# Patient Record
Sex: FEMALE | Race: WHITE | NOT HISPANIC OR LATINO | URBAN - METROPOLITAN AREA
[De-identification: names, ages, dates, MRNs, and addresses within clinical notes are randomized per-mention and may not be internally consistent; named-entity substitution may affect disease eponyms.]

---

## 2023-10-10 ENCOUNTER — OFFICE VISIT (OUTPATIENT)
Dept: PRIMARY CARE | Facility: CLINIC | Age: 58
End: 2023-10-10
Payer: COMMERCIAL

## 2023-10-10 VITALS
TEMPERATURE: 97.3 F | DIASTOLIC BLOOD PRESSURE: 100 MMHG | SYSTOLIC BLOOD PRESSURE: 159 MMHG | HEIGHT: 66 IN | WEIGHT: 176 LBS | OXYGEN SATURATION: 97 % | HEART RATE: 102 BPM | BODY MASS INDEX: 28.28 KG/M2

## 2023-10-10 DIAGNOSIS — I30.9 ACUTE PERICARDITIS, UNSPECIFIED TYPE (HHS-HCC): Primary | ICD-10-CM

## 2023-10-10 DIAGNOSIS — R61 SWEATING PROFUSELY: ICD-10-CM

## 2023-10-10 DIAGNOSIS — Z23 NEEDS FLU SHOT: ICD-10-CM

## 2023-10-10 DIAGNOSIS — E28.2 PCOS (POLYCYSTIC OVARIAN SYNDROME): ICD-10-CM

## 2023-10-10 PROCEDURE — 90682 RIV4 VACC RECOMBINANT DNA IM: CPT | Performed by: FAMILY MEDICINE

## 2023-10-10 PROCEDURE — 1036F TOBACCO NON-USER: CPT | Performed by: FAMILY MEDICINE

## 2023-10-10 PROCEDURE — 99495 TRANSJ CARE MGMT MOD F2F 14D: CPT | Performed by: FAMILY MEDICINE

## 2023-10-10 PROCEDURE — 90471 IMMUNIZATION ADMIN: CPT | Performed by: FAMILY MEDICINE

## 2023-10-10 RX ORDER — CALCIUM CITRATE/VITAMIN D3 200MG-6.25
TABLET ORAL
COMMUNITY
Start: 2018-08-15

## 2023-10-10 RX ORDER — COLCHICINE 0.6 MG/1
0.6 CAPSULE ORAL
COMMUNITY
Start: 2023-10-07 | End: 2023-10-14

## 2023-10-10 RX ORDER — PHENTERMINE HYDROCHLORIDE 37.5 MG/1
37.5 TABLET ORAL
COMMUNITY
Start: 2023-02-02 | End: 2023-10-29

## 2023-10-10 RX ORDER — METFORMIN HYDROCHLORIDE 1000 MG/1
1000 TABLET ORAL DAILY
COMMUNITY
End: 2023-11-10 | Stop reason: ALTCHOICE

## 2023-10-10 RX ORDER — FEZOLINETANT 45 MG/1
45 TABLET, FILM COATED ORAL
COMMUNITY
Start: 2023-09-22 | End: 2023-11-10 | Stop reason: ALTCHOICE

## 2023-10-10 RX ORDER — PREDNISONE 20 MG/1
20 TABLET ORAL
COMMUNITY
Start: 2023-10-07 | End: 2023-10-14

## 2023-10-10 RX ORDER — AZITHROMYCIN 250 MG/1
TABLET, FILM COATED ORAL
COMMUNITY
End: 2023-11-10 | Stop reason: ALTCHOICE

## 2023-10-10 NOTE — PROGRESS NOTES
Subjective   Patient ID: Alek Hirsch is a 58 y.o. female who presents for Hospital Follow-up.    Assessment/Plan     Problem List Items Addressed This Visit    None  Visit Diagnoses       Acute pericarditis, unspecified type    -  Primary    PCOS (polycystic ovarian syndrome)        Sweating profusely        Needs flu shot        Relevant Orders    Flu vaccine, quadrivalent, recombinant, preservative free, adult (FLUBLOK) (Completed)            HPI    58-year-old female was recently hospitalized with chest pressure cardiac work-up was negative but concern for pneumonia on her CT chest with possible pericarditis patient was started on antibiotic with colchicine and anti-inflammatory with prednisone  Patient improved and was discharged home  Echo showed normal EF no acute abnormality    Today blood pressure is high advised patient to monitor patient will be off of Adipex azithromycin prednisone  Adipex was initiated by endocrine for weight loss as it may help with PCOS possible sweating    Currently patient is feeling much better no lab work today  Wants flu vaccine    Excessive sweating was seen by me endocrine GI had a significant lab work done no acute abnormality  Was seen by OB/GYN was started on veozah    History of right knee surgery  Mild carotid artery disease bilaterally  PCOS on metformin    No Known Allergies    Current Outpatient Medications   Medication Sig Dispense Refill    azithromycin (Zithromax) 250 mg tablet TAKE 2 TABLETS by mouth today, THEN take 1 TABLET once a day FOR the next 4 DAYS.      colchicine, gout, (Mitigare) 0.6 mg capsule capsule 1 capsule (0.6 mg).      fezolinetant (Veozah) 45 mg tablet 45 mg.      metFORMIN (Glucophage) 1,000 mg tablet Take 1 tablet (1,000 mg) by mouth once daily.      multivit with min-folic acid (Women's Multivitamin Gummies) 200 mcg tablet,chewable Chew.      phentermine (Adipex-P) 37.5 mg tablet 1 tablet (37.5 mg).      predniSONE (Deltasone) 20 mg tablet 1  "tablet (20 mg).       No current facility-administered medications for this visit.       Objective   Visit Vitals  BP (!) 159/100 (BP Location: Left arm, Patient Position: Sitting)   Pulse 102   Temp 36.3 °C (97.3 °F)   Ht 1.676 m (5' 6\")   Wt 79.8 kg (176 lb)   SpO2 97%   BMI 28.41 kg/m²   Smoking Status Never   BSA 1.93 m²     Physical Exam  Constitutional:       General: He is not in acute distress.     Appearance: Normal appearance.   HENT:      Head: Normocephalic and atraumatic.      Nose: Nose normal.   Eyes:      Extraocular Movements: Extraocular movements intact.      Conjunctiva/sclera: Conjunctivae normal.   Cardiovascular:      Rate and Rhythm: Normal rate and regular rhythm.   Pulmonary:      Effort: Pulmonary effort is normal.      Breath sounds: Normal breath sounds.   Skin:     General: Skin is warm.   Neurological:      Mental Status: He is alert and oriented to person, place, and time.   Psychiatric:         Mood and Affect: Mood normal.         Behavior: Behavior normal.   Immunization History   Administered Date(s) Administered    Flu vaccine (IIV4), preservative free *Check age/dose* 08/23/2020, 09/30/2021, 09/30/2022    Flu vaccine, quadrivalent, recombinant, preservative free, adult (FLUBLOK) 10/10/2023    Influenza, Unspecified 11/26/2011    Influenza, seasonal, injectable 10/04/2021    MMR vaccine, subcutaneous (MMR II) 05/24/2012    Moderna SARS-CoV-2 Vaccination 03/18/2021, 04/15/2021, 11/14/2021, 04/03/2022    Novel influenza-H1N1-09, preservative-free 12/30/2009    Td (adult), unspecified 05/24/2012    Typhoid, Parenteral 05/24/2012       Review of Systems    No visits with results within 4 Month(s) from this visit.   Latest known visit with results is:   Legacy Encounter on 01/25/2023   Component Date Value Ref Range Status    C PEPTIDE (NG/ML) IN SER/PLAS 01/25/2023 7.8 (H)  0.7 - 3.9 ng/mL Final    Proinsulin, Intact 01/25/2023 27.4 (H)  <=8.0 pmol/L Final    Insulin 01/25/2023 97 " (H)  3 - 25 uIU/mL Final       Radiology: Reviewed imaging in powerchart.  No results found.    No family history on file.  Social History     Socioeconomic History    Marital status:      Spouse name: None    Number of children: None    Years of education: None    Highest education level: None   Occupational History    None   Tobacco Use    Smoking status: Never    Smokeless tobacco: Never   Substance and Sexual Activity    Alcohol use: Not Currently    Drug use: Never    Sexual activity: None   Other Topics Concern    None   Social History Narrative    None     Social Determinants of Health     Financial Resource Strain: Not on file   Food Insecurity: Not on file   Transportation Needs: Not on file   Physical Activity: Not on file   Stress: Not on file   Social Connections: Not on file   Intimate Partner Violence: Not on file   Housing Stability: Not on file     Past Medical History:   Diagnosis Date    Overweight     Overweight    Personal history of other diseases of the female genital tract     History of PCOS     Past Surgical History:   Procedure Laterality Date    APPENDECTOMY  08/15/2018    Appendectomy    GALLBLADDER SURGERY  08/15/2018    Gallbladder Surgery    OTHER SURGICAL HISTORY  08/15/2018    Total Hysterectomy       Charting was completed using voice recognition technology and may include unintended errors.

## 2023-10-28 DIAGNOSIS — E88.810 METABOLIC SYNDROME: ICD-10-CM

## 2023-10-29 RX ORDER — PHENTERMINE HYDROCHLORIDE 37.5 MG/1
37.5 TABLET ORAL DAILY
Qty: 30 TABLET | Refills: 0 | Status: SHIPPED | OUTPATIENT
Start: 2023-10-29 | End: 2023-12-19

## 2023-11-01 ENCOUNTER — OFFICE VISIT (OUTPATIENT)
Dept: PRIMARY CARE | Facility: CLINIC | Age: 58
End: 2023-11-01
Payer: COMMERCIAL

## 2023-11-01 VITALS
BODY MASS INDEX: 28.61 KG/M2 | HEART RATE: 120 BPM | OXYGEN SATURATION: 99 % | HEIGHT: 66 IN | SYSTOLIC BLOOD PRESSURE: 140 MMHG | WEIGHT: 178 LBS | DIASTOLIC BLOOD PRESSURE: 90 MMHG | TEMPERATURE: 97.3 F

## 2023-11-01 DIAGNOSIS — R06.02 SOB (SHORTNESS OF BREATH) ON EXERTION: ICD-10-CM

## 2023-11-01 DIAGNOSIS — I10 BENIGN ESSENTIAL HYPERTENSION: ICD-10-CM

## 2023-11-01 DIAGNOSIS — R07.9 CHEST PAIN, UNSPECIFIED TYPE: Primary | ICD-10-CM

## 2023-11-01 PROBLEM — M17.11 PRIMARY OSTEOARTHRITIS OF RIGHT KNEE: Status: ACTIVE | Noted: 2023-11-01

## 2023-11-01 PROBLEM — R61 EXCESSIVE SWEATING: Status: ACTIVE | Noted: 2023-11-01

## 2023-11-01 PROBLEM — E78.2 HYPERLIPIDEMIA, MIXED: Status: ACTIVE | Noted: 2023-11-01

## 2023-11-01 PROBLEM — K21.9 GERD (GASTROESOPHAGEAL REFLUX DISEASE): Status: ACTIVE | Noted: 2023-11-01

## 2023-11-01 PROCEDURE — 1036F TOBACCO NON-USER: CPT | Performed by: FAMILY MEDICINE

## 2023-11-01 PROCEDURE — 3077F SYST BP >= 140 MM HG: CPT | Performed by: FAMILY MEDICINE

## 2023-11-01 PROCEDURE — 3080F DIAST BP >= 90 MM HG: CPT | Performed by: FAMILY MEDICINE

## 2023-11-01 PROCEDURE — 99214 OFFICE O/P EST MOD 30 MIN: CPT | Performed by: FAMILY MEDICINE

## 2023-11-01 NOTE — PROGRESS NOTES
Subjective   Patient ID: Alek Hirsch is a 58 y.o. female who presents for Hypertension.    Assessment/Plan     Problem List Items Addressed This Visit       Benign essential hypertension    Relevant Orders    Nuclear Stress Test     Other Visit Diagnoses       Chest pain, unspecified type    -  Primary    Relevant Orders    Nuclear Stress Test    SOB (shortness of breath) on exertion        Relevant Orders    Nuclear Stress Test          HPI    58-year-old female was recently hospitalized with chest pressure cardiac work-up was negative but concern for pneumonia on her CT chest with possible pericarditis patient was started on antibiotic with colchicine and anti-inflammatory with prednisone  Patient improved and was discharged home  Echo showed normal EF no acute abnormality    No chest pain or shortness of breath on exertion present  We will consider nuclear stress test  Blood pressure is high advised patient to stop Adipex  We will follow-up after nuclear stress test May start patient on atenolol  May consider starting patient on gabapentin    About high insulin level  We will also describe her abnormal CT chest in the hospital  We may refer patient to pulmonary and hematology oncology          This visit  Adipex was initiated by endocrine for weight loss as it may help with PCOS possible sweating    Currently patient is feeling much better no lab work today  Wants flu vaccine    Excessive sweating was seen by me endocrine GI had a significant lab work done no acute abnormality  Was seen by OB/GYN was started on veozah    History of right knee surgery  Mild carotid artery disease bilaterally  PCOS on metformin    No Known Allergies    Current Outpatient Medications   Medication Sig Dispense Refill    fezolinetant (Veozah) 45 mg tablet 45 mg.      metFORMIN (Glucophage) 1,000 mg tablet Take 1 tablet (1,000 mg) by mouth once daily.      multivit with min-folic acid (Women's Multivitamin Gummies) 200 mcg  "tablet,chewable Chew.      phentermine (Adipex-P) 37.5 mg tablet TAKE 1 TABLET BY MOUTH DAILY 30 tablet 0    azithromycin (Zithromax) 250 mg tablet TAKE 2 TABLETS by mouth today, THEN take 1 TABLET once a day FOR the next 4 DAYS.       No current facility-administered medications for this visit.       Objective   Visit Vitals  /90 (BP Location: Left arm, Patient Position: Sitting)   Pulse (!) 120   Temp 36.3 °C (97.3 °F)   Ht 1.676 m (5' 6\")   Wt 80.7 kg (178 lb)   SpO2 99%   BMI 28.73 kg/m²   Smoking Status Never   BSA 1.94 m²     Physical Exam  Constitutional:       General: He is not in acute distress.     Appearance: Normal appearance.   HENT:      Head: Normocephalic and atraumatic.      Nose: Nose normal.   Eyes:      Extraocular Movements: Extraocular movements intact.      Conjunctiva/sclera: Conjunctivae normal.   Cardiovascular:      Rate and Rhythm: Normal rate and regular rhythm.   Pulmonary:      Effort: Pulmonary effort is normal.      Breath sounds: Normal breath sounds.   Skin:     General: Skin is warm.   Neurological:      Mental Status: He is alert and oriented to person, place, and time.   Psychiatric:         Mood and Affect: Mood normal.         Behavior: Behavior normal.   Immunization History   Administered Date(s) Administered    Flu vaccine (IIV4), preservative free *Check age/dose* 08/23/2020, 09/30/2021, 09/30/2022    Flu vaccine, quadrivalent, recombinant, preservative free, adult (FLUBLOK) 10/10/2023    Influenza, Unspecified 11/26/2011    Influenza, seasonal, injectable 10/04/2021    MMR vaccine, subcutaneous (MMR II) 05/24/2012    Moderna SARS-CoV-2 Vaccination 03/18/2021, 04/15/2021, 11/14/2021, 04/03/2022    Novel influenza-H1N1-09, preservative-free 12/30/2009    Td (adult), unspecified 05/24/2012    Typhoid, Parenteral 05/24/2012       Review of Systems    No visits with results within 4 Month(s) from this visit.   Latest known visit with results is:   Legacy Encounter on " 01/25/2023   Component Date Value Ref Range Status    C PEPTIDE (NG/ML) IN SER/PLAS 01/25/2023 7.8 (H)  0.7 - 3.9 ng/mL Final    Proinsulin, Intact 01/25/2023 27.4 (H)  <=8.0 pmol/L Final    Insulin 01/25/2023 97 (H)  3 - 25 uIU/mL Final       Radiology: Reviewed imaging in powerchart.  No results found.    No family history on file.  Social History     Socioeconomic History    Marital status:      Spouse name: None    Number of children: None    Years of education: None    Highest education level: None   Occupational History    None   Tobacco Use    Smoking status: Never    Smokeless tobacco: Never   Substance and Sexual Activity    Alcohol use: Not Currently    Drug use: Never    Sexual activity: None   Other Topics Concern    None   Social History Narrative    None     Social Determinants of Health     Financial Resource Strain: Not on file   Food Insecurity: Not on file   Transportation Needs: Not on file   Physical Activity: Not on file   Stress: Not on file   Social Connections: Not on file   Intimate Partner Violence: Not on file   Housing Stability: Not on file     Past Medical History:   Diagnosis Date    Overweight     Overweight    Personal history of other diseases of the female genital tract     History of PCOS     Past Surgical History:   Procedure Laterality Date    APPENDECTOMY  08/15/2018    Appendectomy    GALLBLADDER SURGERY  08/15/2018    Gallbladder Surgery    OTHER SURGICAL HISTORY  08/15/2018    Total Hysterectomy       Charting was completed using voice recognition technology and may include unintended errors.

## 2023-11-08 PROBLEM — R73.03 PREDIABETES: Status: ACTIVE | Noted: 2023-11-08

## 2023-11-08 PROBLEM — R55 VASOVAGAL SYNCOPE: Status: ACTIVE | Noted: 2023-11-08

## 2023-11-08 PROBLEM — R94.31 PROLONGED QT INTERVAL: Status: ACTIVE | Noted: 2023-11-08

## 2023-11-08 PROBLEM — Z96.652 HISTORY OF LEFT KNEE REPLACEMENT: Status: ACTIVE | Noted: 2023-06-14

## 2023-11-08 PROBLEM — R94.31 QT PROLONGATION: Status: ACTIVE | Noted: 2023-11-08

## 2023-11-08 PROBLEM — R73.9 HYPERGLYCEMIA: Status: ACTIVE | Noted: 2023-11-08

## 2023-11-08 PROBLEM — E88.819 INSULIN RESISTANCE: Status: ACTIVE | Noted: 2023-11-08

## 2023-11-08 PROBLEM — E66.9 OBESITY WITH BODY MASS INDEX 30 OR GREATER: Status: ACTIVE | Noted: 2023-11-08

## 2023-11-08 PROBLEM — E16.1 HYPERINSULINISM: Status: ACTIVE | Noted: 2023-11-08

## 2023-11-08 PROBLEM — E28.2 POLYCYSTIC OVARY SYNDROME: Status: ACTIVE | Noted: 2023-11-08

## 2023-11-08 PROBLEM — D49.0 PANCREATIC NEOPLASM: Status: ACTIVE | Noted: 2023-11-08

## 2023-11-08 PROBLEM — R23.2 HOT FLASHES: Status: ACTIVE | Noted: 2023-11-08

## 2023-11-08 PROBLEM — E34.2: Status: ACTIVE | Noted: 2023-11-08

## 2023-11-08 PROBLEM — E66.3 OVERWEIGHT: Status: ACTIVE | Noted: 2023-11-08

## 2023-11-08 PROBLEM — E66.9 OBESITY: Status: ACTIVE | Noted: 2018-08-16

## 2023-11-08 PROBLEM — M17.10 ARTHROPATHY OF KNEE: Status: ACTIVE | Noted: 2023-11-08

## 2023-11-08 PROBLEM — R89.9 ABNORMAL LABORATORY TEST RESULT: Status: ACTIVE | Noted: 2023-11-08

## 2023-11-08 PROBLEM — G43.909 MIGRAINE: Status: ACTIVE | Noted: 2023-11-08

## 2023-11-08 PROBLEM — E55.9 VITAMIN D DEFICIENCY: Status: ACTIVE | Noted: 2023-11-08

## 2023-11-08 PROBLEM — T84.82XA ARTHROFIBROSIS OF TOTAL KNEE REPLACEMENT (CMS-HCC): Status: ACTIVE | Noted: 2023-11-08

## 2023-11-08 PROBLEM — E16.1 INAPPROPRIATELY HIGH SERUM INSULIN: Status: ACTIVE | Noted: 2023-11-08

## 2023-11-08 PROBLEM — R13.10 DYSPHAGIA: Status: ACTIVE | Noted: 2023-11-08

## 2023-11-08 PROBLEM — E28.2 PCOS (POLYCYSTIC OVARIAN SYNDROME): Status: ACTIVE | Noted: 2023-11-08

## 2023-11-08 PROBLEM — R07.9 ACUTE CHEST PAIN: Status: ACTIVE | Noted: 2023-11-08

## 2023-11-08 RX ORDER — MULTIVIT-MIN/IRON FUM/FOLIC AC 7.5 MG-4
1 TABLET ORAL
COMMUNITY
Start: 2012-05-30 | End: 2023-11-10 | Stop reason: ALTCHOICE

## 2023-11-08 RX ORDER — IBUPROFEN 600 MG/1
1 TABLET ORAL EVERY 6 HOURS PRN
COMMUNITY
Start: 2006-02-13 | End: 2023-11-10 | Stop reason: ALTCHOICE

## 2023-11-08 RX ORDER — FEZOLINETANT 45 MG/1
1 TABLET, FILM COATED ORAL DAILY
COMMUNITY
Start: 2023-10-13 | End: 2023-12-19

## 2023-11-08 RX ORDER — METFORMIN HYDROCHLORIDE 500 MG/1
TABLET, EXTENDED RELEASE ORAL
COMMUNITY
Start: 2023-09-22 | End: 2024-03-29

## 2023-11-09 ENCOUNTER — OFFICE VISIT (OUTPATIENT)
Dept: ENDOCRINOLOGY | Facility: CLINIC | Age: 58
End: 2023-11-09
Payer: COMMERCIAL

## 2023-11-09 ENCOUNTER — TELEPHONE (OUTPATIENT)
Dept: PRIMARY CARE | Facility: CLINIC | Age: 58
End: 2023-11-09

## 2023-11-09 VITALS — SYSTOLIC BLOOD PRESSURE: 138 MMHG | WEIGHT: 179 LBS | DIASTOLIC BLOOD PRESSURE: 84 MMHG | BODY MASS INDEX: 28.89 KG/M2

## 2023-11-09 DIAGNOSIS — E88.810 DYSMETABOLIC SYNDROME X: Primary | ICD-10-CM

## 2023-11-09 DIAGNOSIS — E28.2 POLYCYSTIC OVARY SYNDROME: ICD-10-CM

## 2023-11-09 DIAGNOSIS — E16.1 HYPERINSULINISM: ICD-10-CM

## 2023-11-09 DIAGNOSIS — R61 EXCESSIVE SWEATING: ICD-10-CM

## 2023-11-09 PROCEDURE — 99214 OFFICE O/P EST MOD 30 MIN: CPT | Performed by: INTERNAL MEDICINE

## 2023-11-09 PROCEDURE — 1036F TOBACCO NON-USER: CPT | Performed by: INTERNAL MEDICINE

## 2023-11-09 PROCEDURE — 3079F DIAST BP 80-89 MM HG: CPT | Performed by: INTERNAL MEDICINE

## 2023-11-09 PROCEDURE — 3075F SYST BP GE 130 - 139MM HG: CPT | Performed by: INTERNAL MEDICINE

## 2023-11-09 NOTE — PROGRESS NOTES
Patient ID: Alek Hirsch is a 58 y.o. female who presents for Follow-up.  HPI  The patient comes in for follow up.    She has PCOS and insulin resistance diaphoresis hyperlipidemia hypertension.    She continues on metformin ER dropped down to 1000 mg which she has had no trouble tolerating and feels no different on the thousand versus the 2000.    She had been on phentermine up until about a week ago.    In October she was hospitalized with chest pain that was felt to be pleurisy.    She was on steroids and antibiotics.    She had a stress test that was negative her blood pressure bumped up.    The role of this her diaphoresis has continued.    She was put on Veozah by her OB/GYN which has had no impact.    She feels her appetite has not changed off of the phentermine at this point and thinks that she has made some lifestyle changes.    She is seeing her primary care doctor tomorrow.    ROS  Comprehensive review of systems is negative.    Objective   Physical Exam  Visit Vitals  /84      Vitals:    11/09/23 1624   Weight: 81.2 kg (179 lb)      Body mass index is 28.89 kg/m².      Weight 179 at 10 pounds still down 24    Eyes normal  ENT normal. No adenopathy  Thyroid palpable and normal. No nodules  Chest clear to auscultation  Heart sounds are normal  Abdomen nontender. Bowel sounds normal. No organomegaly  Feet are okay    Assessment/Plan     1.  Insulin resistance  2.  Diaphoresis  3.  Hypertension  4.  Hyperlipidemia  5.  PCOS    She will maintain her current regimen.    She will stay off the phentermine.    She will watch for hunger.    We again discussed endocrine causes for diaphoresis which we have essentially exhausted.    Advised that may be seeing dermatology as her next step.    She will follow-up with me in 3 months sooner as needed.

## 2023-11-10 ENCOUNTER — OFFICE VISIT (OUTPATIENT)
Dept: PRIMARY CARE | Facility: CLINIC | Age: 58
End: 2023-11-10
Payer: COMMERCIAL

## 2023-11-10 VITALS
TEMPERATURE: 97.3 F | DIASTOLIC BLOOD PRESSURE: 92 MMHG | HEART RATE: 89 BPM | WEIGHT: 180 LBS | HEIGHT: 66 IN | OXYGEN SATURATION: 99 % | SYSTOLIC BLOOD PRESSURE: 142 MMHG | BODY MASS INDEX: 28.93 KG/M2

## 2023-11-10 DIAGNOSIS — R61 EXCESSIVE SWEATING: Primary | ICD-10-CM

## 2023-11-10 DIAGNOSIS — I10 BENIGN ESSENTIAL HYPERTENSION: ICD-10-CM

## 2023-11-10 DIAGNOSIS — R73.9 HYPERGLYCEMIA: ICD-10-CM

## 2023-11-10 PROCEDURE — 1036F TOBACCO NON-USER: CPT | Performed by: FAMILY MEDICINE

## 2023-11-10 PROCEDURE — 3080F DIAST BP >= 90 MM HG: CPT | Performed by: FAMILY MEDICINE

## 2023-11-10 PROCEDURE — 99214 OFFICE O/P EST MOD 30 MIN: CPT | Performed by: FAMILY MEDICINE

## 2023-11-10 PROCEDURE — 3077F SYST BP >= 140 MM HG: CPT | Performed by: FAMILY MEDICINE

## 2023-11-10 RX ORDER — OXYBUTYNIN CHLORIDE 5 MG/1
2.5 TABLET ORAL NIGHTLY
Qty: 15 TABLET | Refills: 0 | Status: SHIPPED | OUTPATIENT
Start: 2023-11-10 | End: 2023-12-10

## 2023-11-10 NOTE — PROGRESS NOTES
Subjective   Patient ID: Alek Hirsch is a 58 y.o. female who presents for Follow-up.    Assessment/Plan     Problem List Items Addressed This Visit       Benign essential hypertension    Relevant Medications    oxybutynin (Ditropan) 5 mg tablet    Other Relevant Orders    C-Peptide    Insulin, Random    Proinsulin, Intact    CT chest wo IV contrast    Excessive sweating - Primary    Relevant Medications    oxybutynin (Ditropan) 5 mg tablet    Other Relevant Orders    C-Peptide    Insulin, Random    Proinsulin, Intact    CT chest wo IV contrast    Hyperglycemia    Relevant Medications    oxybutynin (Ditropan) 5 mg tablet    Other Relevant Orders    C-Peptide    Insulin, Random    Proinsulin, Intact    CT chest wo IV contrast   HPI    58-year-old female follow-up on excessive sweating at night    Holding Adipex  Was recently seen by endocrine  Previously abnormal CT chest we will repeat  Repeat lab work  Advised to follow-up with heme-onc  Consider low-dose oxybutynin  nuclear stress test - WNL  Blood pressure is high advised patient to stop Adipex    Follow-up for call us back within a month            This visit  Adipex was initiated by endocrine for weight loss as it may help with PCOS possible sweating    Currently patient is feeling much better no lab work today  Wants flu vaccine    Excessive sweating was seen by me endocrine GI had a significant lab work done no acute abnormality  Was seen by OB/GYN was started on veozah    History of right knee surgery  Mild carotid artery disease bilaterally  PCOS on metformin    No Known Allergies    Current Outpatient Medications   Medication Sig Dispense Refill    fezolinetant (Veozah) 45 mg tablet Take 1 tablet by mouth once daily. Take at the same time every day      metFORMIN  mg 24 hr tablet Take by mouth. 1,000 mg 2 tabs, ORAL, DAILY, 120 EA, 0 Refill(s), TAKE 2 TABLETS BY MOUTH TWICE DAILY      multivit with min-folic acid (Women's Multivitamin Gummies) 200  "mcg tablet,chewable Chew.      oxybutynin (Ditropan) 5 mg tablet Take 0.5 tablets (2.5 mg) by mouth once daily at bedtime. 15 tablet 0    phentermine (Adipex-P) 37.5 mg tablet TAKE 1 TABLET BY MOUTH DAILY (Patient not taking: Reported on 11/10/2023) 30 tablet 0     No current facility-administered medications for this visit.       Objective   Visit Vitals  BP (!) 142/92 (BP Location: Left arm, Patient Position: Sitting)   Pulse 89   Temp 36.3 °C (97.3 °F)   Ht 1.676 m (5' 6\")   Wt 81.6 kg (180 lb)   SpO2 99%   BMI 29.05 kg/m²   Smoking Status Never   BSA 1.95 m²     Physical Exam  Constitutional:       General: He is not in acute distress.     Appearance: Normal appearance.   HENT:      Head: Normocephalic and atraumatic.      Nose: Nose normal.   Eyes:      Extraocular Movements: Extraocular movements intact.      Conjunctiva/sclera: Conjunctivae normal.   Cardiovascular:      Rate and Rhythm: Normal rate and regular rhythm.   Pulmonary:      Effort: Pulmonary effort is normal.      Breath sounds: Normal breath sounds.   Skin:     General: Skin is warm.   Neurological:      Mental Status: He is alert and oriented to person, place, and time.   Psychiatric:         Mood and Affect: Mood normal.         Behavior: Behavior normal.   Immunization History   Administered Date(s) Administered    Flu vaccine (IIV4), preservative free *Check age/dose* 08/23/2020, 09/30/2021, 09/30/2022    Flu vaccine, quadrivalent, recombinant, preservative free, adult (FLUBLOK) 10/10/2023    Influenza, Unspecified 11/26/2011    Influenza, seasonal, injectable 11/07/2016, 10/04/2021    Influenza, seasonal, injectable, preservative free 10/04/2019    MMR vaccine, subcutaneous (MMR II) 05/24/2012    Moderna SARS-CoV-2 Vaccination 03/18/2021, 04/15/2021, 11/08/2021, 04/03/2022    Novel influenza-H1N1-09, preservative-free 12/30/2009    Td (adult), unspecified 05/24/2012    Typhoid, Parenteral 05/24/2012       Review of Systems    No visits with " results within 4 Month(s) from this visit.   Latest known visit with results is:   Legacy Encounter on 01/25/2023   Component Date Value Ref Range Status    C PEPTIDE (NG/ML) IN SER/PLAS 01/25/2023 7.8 (H)  0.7 - 3.9 ng/mL Final    Proinsulin, Intact 01/25/2023 27.4 (H)  <=8.0 pmol/L Final    Insulin 01/25/2023 97 (H)  3 - 25 uIU/mL Final       Radiology: Reviewed imaging in powerchart.  No results found.    Family History   Problem Relation Name Age of Onset    Crohn's disease Mother      Heart attack Father       Social History     Socioeconomic History    Marital status:      Spouse name: None    Number of children: None    Years of education: None    Highest education level: None   Occupational History    None   Tobacco Use    Smoking status: Never    Smokeless tobacco: Never   Substance and Sexual Activity    Alcohol use: Not Currently    Drug use: Never    Sexual activity: None   Other Topics Concern    None   Social History Narrative    None     Social Determinants of Health     Financial Resource Strain: Not on file   Food Insecurity: Not on file   Transportation Needs: Not on file   Physical Activity: Not on file   Stress: Not on file   Social Connections: Not on file   Intimate Partner Violence: Not on file   Housing Stability: Not on file     Past Medical History:   Diagnosis Date    Overweight     Overweight    Personal history of other diseases of the female genital tract     History of PCOS     Past Surgical History:   Procedure Laterality Date    APPENDECTOMY  08/15/2018    Appendectomy    GALLBLADDER SURGERY  08/15/2018    Gallbladder Surgery    OTHER SURGICAL HISTORY  08/15/2018    Total Hysterectomy       Charting was completed using voice recognition technology and may include unintended errors.

## 2023-11-21 LAB
NON-UH HIE C-PEPTIDE: 2.6 NG/ML (ref 0.5–3.3)
NON-UH HIE INSULIN, FASTING: 16 (ref 3–25)

## 2023-11-25 LAB — NON-UH HIE MISC SENDOUT: NORMAL

## 2023-12-01 ENCOUNTER — TELEPHONE (OUTPATIENT)
Dept: PRIMARY CARE | Facility: CLINIC | Age: 58
End: 2023-12-01
Payer: COMMERCIAL

## 2023-12-01 NOTE — TELEPHONE ENCOUNTER
Patient went to go see Hematologist this morning and looked at the patient and said I dont know why your here.         Please call the patient

## 2023-12-12 ENCOUNTER — TELEPHONE (OUTPATIENT)
Dept: PRIMARY CARE | Facility: CLINIC | Age: 58
End: 2023-12-12

## 2023-12-12 NOTE — TELEPHONE ENCOUNTER
PT STATES SHE IS WAITING FOR A CALL BACK. DR YE WAS CHECKING ABOUT SOME INFORMATION WITH A HEMATOLOGIST AND FRIEND. CALLING TO SEE IF ANY UPDATE

## 2023-12-19 ENCOUNTER — OFFICE VISIT (OUTPATIENT)
Dept: PRIMARY CARE | Facility: CLINIC | Age: 58
End: 2023-12-19
Payer: COMMERCIAL

## 2023-12-19 VITALS
OXYGEN SATURATION: 99 % | TEMPERATURE: 97.6 F | BODY MASS INDEX: 28.93 KG/M2 | DIASTOLIC BLOOD PRESSURE: 89 MMHG | SYSTOLIC BLOOD PRESSURE: 142 MMHG | WEIGHT: 180 LBS | HEIGHT: 66 IN | HEART RATE: 101 BPM

## 2023-12-19 DIAGNOSIS — R05.1 ACUTE COUGH: Primary | ICD-10-CM

## 2023-12-19 LAB
NON-UH HIE RAPID INFLUENZA A ANTIGEN TEST: POSITIVE
NON-UH HIE RAPID INFLUENZA B ANTIGEN TEST: NEGATIVE
NON-UH HIE RFAB INT QC: PRESENT

## 2023-12-19 PROCEDURE — 1036F TOBACCO NON-USER: CPT | Performed by: FAMILY MEDICINE

## 2023-12-19 PROCEDURE — 3077F SYST BP >= 140 MM HG: CPT | Performed by: FAMILY MEDICINE

## 2023-12-19 PROCEDURE — 3079F DIAST BP 80-89 MM HG: CPT | Performed by: FAMILY MEDICINE

## 2023-12-19 PROCEDURE — 99213 OFFICE O/P EST LOW 20 MIN: CPT | Performed by: FAMILY MEDICINE

## 2023-12-19 RX ORDER — DOXYCYCLINE 100 MG/1
100 CAPSULE ORAL 2 TIMES DAILY
Qty: 20 CAPSULE | Refills: 0 | Status: SHIPPED | OUTPATIENT
Start: 2023-12-19 | End: 2023-12-29

## 2023-12-19 RX ORDER — CODEINE PHOSPHATE AND GUAIFENESIN 10; 100 MG/5ML; MG/5ML
5 SOLUTION ORAL EVERY 6 HOURS PRN
Qty: 140 ML | Refills: 0 | Status: SHIPPED | OUTPATIENT
Start: 2023-12-19 | End: 2023-12-26

## 2023-12-19 RX ORDER — BENZONATATE 100 MG/1
200 CAPSULE ORAL 3 TIMES DAILY PRN
Qty: 30 CAPSULE | Refills: 0 | Status: SHIPPED | OUTPATIENT
Start: 2023-12-19 | End: 2023-12-24

## 2023-12-19 RX ORDER — OXYBUTYNIN CHLORIDE 5 MG/1
TABLET ORAL
COMMUNITY
Start: 2023-12-01 | End: 2023-12-19

## 2023-12-19 NOTE — PROGRESS NOTES
"Subjective   Patient ID: Alek Hirsch is a 58 y.o. female who presents for Cough and Nasal Congestion.    Assessment/Plan     Problem List Items Addressed This Visit    None      HPI  58-year-old female complaining of cough congestion since last few days without any fever chills no nausea vomiting diarrhea constipation difficulty breathing nasal discharge sore throat present  COVID-19 negative  Advised patient to check for flu    Consider about treatment call us with worsening symptoms    Also with excessive nightly sweating patient stopped veozah oxybutynin as it did not help we will consider amitriptyline 10 mg at night with hydroxyzine 25 mg patient call us back after new year and we will send this medication to the pharmacy    No Known Allergies    Current Outpatient Medications   Medication Sig Dispense Refill    metFORMIN  mg 24 hr tablet Take by mouth. 1,000 mg 2 tabs, ORAL, DAILY, 120 EA, 0 Refill(s), TAKE 2 TABLETS BY MOUTH TWICE DAILY      multivit with min-folic acid (Women's Multivitamin Gummies) 200 mcg tablet,chewable Chew.      phentermine (Adipex-P) 37.5 mg tablet TAKE 1 TABLET BY MOUTH DAILY 30 tablet 0     No current facility-administered medications for this visit.       Objective   Visit Vitals  /89 (BP Location: Left arm, Patient Position: Sitting)   Pulse 101   Temp 36.4 °C (97.6 °F)   Ht 1.676 m (5' 6\")   Wt 81.6 kg (180 lb)   SpO2 99%   BMI 29.05 kg/m²   Smoking Status Never   BSA 1.95 m²     Physical Exam  Constitutional:       General: He is not in acute distress.     Appearance: Normal appearance.   HENT:      Head: Normocephalic and atraumatic.      Nose: Nose normal.   Eyes:      Extraocular Movements: Extraocular movements intact.      Conjunctiva/sclera: Conjunctivae normal.   Cardiovascular:      Rate and Rhythm: Normal rate and regular rhythm.   Pulmonary:      Effort: Pulmonary effort is normal.      Breath sounds: Normal breath sounds.   Skin:     General: Skin is " warm.   Neurological:      Mental Status: He is alert and oriented to person, place, and time.   Psychiatric:         Mood and Affect: Mood normal.         Behavior: Behavior normal.     Bilateral nasal mucosa edema erythema clear discharge present  Oropharynx within normal limits    Review of Systems    Orders Only on 11/25/2023   Component Date Value Ref Range Status    [facility] Misc Sendout 11/25/2023 See Report   Final   Orders Only on 11/21/2023   Component Date Value Ref Range Status    [facility] C-Peptide 11/21/2023 2.6  0.5 - 3.3 ng/mL Final    [facility] Insulin, fasting 11/21/2023 16  3 - 25 Final           Family History   Problem Relation Name Age of Onset    Crohn's disease Mother      Heart attack Father       Social History     Socioeconomic History    Marital status:      Spouse name: None    Number of children: None    Years of education: None    Highest education level: None   Occupational History    None   Tobacco Use    Smoking status: Never    Smokeless tobacco: Never   Substance and Sexual Activity    Alcohol use: Not Currently    Drug use: Never    Sexual activity: None   Other Topics Concern    None   Social History Narrative    None     Social Determinants of Health     Financial Resource Strain: Not on file   Food Insecurity: Not on file   Transportation Needs: Not on file   Physical Activity: Not on file   Stress: Not on file   Social Connections: Not on file   Intimate Partner Violence: Not on file   Housing Stability: Not on file     Past Medical History:   Diagnosis Date    Overweight     Overweight    Personal history of other diseases of the female genital tract     History of PCOS     Past Surgical History:   Procedure Laterality Date    APPENDECTOMY  08/15/2018    Appendectomy    GALLBLADDER SURGERY  08/15/2018    Gallbladder Surgery    OTHER SURGICAL HISTORY  08/15/2018    Total Hysterectomy       Charting was completed using voice recognition technology and may include  unintended errors.

## 2023-12-20 RX ORDER — OSELTAMIVIR PHOSPHATE 75 MG/1
75 CAPSULE ORAL 2 TIMES DAILY
Qty: 10 CAPSULE | Refills: 0 | Status: SHIPPED | OUTPATIENT
Start: 2023-12-20 | End: 2023-12-25

## 2023-12-27 ENCOUNTER — TELEPHONE (OUTPATIENT)
Dept: PRIMARY CARE | Facility: CLINIC | Age: 58
End: 2023-12-27
Payer: COMMERCIAL

## 2024-01-03 DIAGNOSIS — R61 EXCESSIVE SWEATING: Primary | ICD-10-CM

## 2024-01-03 RX ORDER — HYDROXYZINE HYDROCHLORIDE 10 MG/1
10 TABLET, FILM COATED ORAL NIGHTLY
Qty: 30 TABLET | Refills: 0 | Status: SHIPPED | OUTPATIENT
Start: 2024-01-03 | End: 2024-02-15 | Stop reason: ALTCHOICE

## 2024-01-03 RX ORDER — AMITRIPTYLINE HYDROCHLORIDE 10 MG/1
10 TABLET, FILM COATED ORAL NIGHTLY
Qty: 30 TABLET | Refills: 0 | Status: SHIPPED | OUTPATIENT
Start: 2024-01-03 | End: 2024-02-15 | Stop reason: ALTCHOICE

## 2024-01-17 DIAGNOSIS — R61 EXCESSIVE SWEATING: ICD-10-CM

## 2024-01-17 DIAGNOSIS — R61 SWEATING PROFUSELY: ICD-10-CM

## 2024-01-17 RX ORDER — HYDROXYZINE HYDROCHLORIDE 25 MG/1
25 TABLET, FILM COATED ORAL NIGHTLY
Qty: 30 TABLET | Refills: 0 | Status: SHIPPED | OUTPATIENT
Start: 2024-01-17 | End: 2024-02-15 | Stop reason: SDUPTHER

## 2024-01-17 RX ORDER — AMITRIPTYLINE HYDROCHLORIDE 25 MG/1
25 TABLET, FILM COATED ORAL NIGHTLY
Qty: 30 TABLET | Refills: 0 | Status: SHIPPED | OUTPATIENT
Start: 2024-01-17 | End: 2024-02-15 | Stop reason: SDUPTHER

## 2024-02-09 ENCOUNTER — OFFICE VISIT (OUTPATIENT)
Dept: ENDOCRINOLOGY | Facility: CLINIC | Age: 59
End: 2024-02-09
Payer: COMMERCIAL

## 2024-02-09 ENCOUNTER — APPOINTMENT (OUTPATIENT)
Dept: ENDOCRINOLOGY | Facility: CLINIC | Age: 59
End: 2024-02-09
Payer: COMMERCIAL

## 2024-02-09 VITALS
BODY MASS INDEX: 30.99 KG/M2 | SYSTOLIC BLOOD PRESSURE: 118 MMHG | DIASTOLIC BLOOD PRESSURE: 64 MMHG | WEIGHT: 192 LBS | HEART RATE: 72 BPM

## 2024-02-09 DIAGNOSIS — R73.01 IMPAIRED FASTING GLUCOSE: ICD-10-CM

## 2024-02-09 DIAGNOSIS — R73.03 PREDIABETES: Primary | ICD-10-CM

## 2024-02-09 DIAGNOSIS — E28.2 POLYCYSTIC OVARY SYNDROME: ICD-10-CM

## 2024-02-09 PROCEDURE — 3074F SYST BP LT 130 MM HG: CPT | Performed by: INTERNAL MEDICINE

## 2024-02-09 PROCEDURE — 1036F TOBACCO NON-USER: CPT | Performed by: INTERNAL MEDICINE

## 2024-02-09 PROCEDURE — 99214 OFFICE O/P EST MOD 30 MIN: CPT | Performed by: INTERNAL MEDICINE

## 2024-02-09 PROCEDURE — 3078F DIAST BP <80 MM HG: CPT | Performed by: INTERNAL MEDICINE

## 2024-02-09 RX ORDER — PHENTERMINE HYDROCHLORIDE 37.5 MG/1
37.5 CAPSULE ORAL
Qty: 30 CAPSULE | Refills: 2 | Status: SHIPPED | OUTPATIENT
Start: 2024-02-09 | End: 2024-05-10 | Stop reason: WASHOUT

## 2024-02-09 NOTE — PROGRESS NOTES
Patient ID: Alek Hirsch is a 58 y.o. female who presents for Follow-up.  HPI  The patient comes in for follow up.    She has PCOS and insulin resistance diaphoresis hyperlipidemia hypertension.    She continues on metformin ER dropped down to 1000 mg which she has had no trouble tolerating and feels no different on the 1000 versus the 2000.    She had been on phentermine up until October.    She continues to struggle with diaphoresis has continued.    She was put on Veozah by her OB/GYN which has had no impact.    She has been on Elavil and hydroxyzine was in the no impact.    ROS  Comprehensive review of systems is negative.    Objective   Physical Exam  Visit Vitals  /64   Pulse 72      Vitals:    02/09/24 1451   Weight: 87.1 kg (192 lb)      Body mass index is 30.99 kg/m².      Weight 192 up 13 pounds still down 11    Eyes normal  ENT normal. No adenopathy  Thyroid palpable and normal. No nodules  Chest clear to auscultation  Heart sounds are normal  Abdomen nontender. Bowel sounds normal. No organomegaly  Feet are okay    Current Outpatient Medications   Medication Sig Dispense Refill    amitriptyline (Elavil) 10 mg tablet Take 1 tablet (10 mg) by mouth once daily at bedtime. 30 tablet 0    amitriptyline (Elavil) 25 mg tablet Take 1 tablet (25 mg) by mouth once daily at bedtime. 30 tablet 0    hydrOXYzine HCL (Atarax) 10 mg tablet Take 1 tablet (10 mg) by mouth once daily at bedtime. 30 tablet 0    hydrOXYzine HCL (Atarax) 25 mg tablet Take 1 tablet (25 mg) by mouth once daily at bedtime. 30 tablet 0    metFORMIN  mg 24 hr tablet Take by mouth. 1,000 mg 2 tabs, ORAL, DAILY, 120 EA, 0 Refill(s), TAKE 2 TABLETS BY MOUTH TWICE DAILY      multivit with min-folic acid (Women's Multivitamin Gummies) 200 mcg tablet,chewable Chew.      phentermine 37.5 mg capsule Take 1 capsule (37.5 mg) by mouth once daily in the morning. Take before meals. 30 capsule 2     No current facility-administered medications for  this visit.       Assessment/Plan     1.  Insulin resistance  2.  Diaphoresis  3.  Hypertension  4.  Hyperlipidemia  5.  PCOS    She will continue the metformin.    She will work on diet and exercise.    We again discussed endocrine causes for diaphoresis for which we have essentially exhausted all possibilities.    Advise she try dermatology.    She will resume the phentermine 37.5 mg.    I have personally reviewed the [facility] report for this patient. This report is scanned into the electronic medical record. I consider the risks of abuse, dependence, addiction and diversion. I believe that is clinically appropriate for this patient to be prescribed this medication.    She will follow-up with me in 3 months sooner as needed.

## 2024-02-15 DIAGNOSIS — R61 EXCESSIVE SWEATING: ICD-10-CM

## 2024-02-15 RX ORDER — AMITRIPTYLINE HYDROCHLORIDE 25 MG/1
25 TABLET, FILM COATED ORAL NIGHTLY
Qty: 30 TABLET | Refills: 0 | Status: SHIPPED | OUTPATIENT
Start: 2024-02-15 | End: 2024-04-18 | Stop reason: ALTCHOICE

## 2024-02-15 RX ORDER — HYDROXYZINE HYDROCHLORIDE 50 MG/1
50 TABLET, FILM COATED ORAL NIGHTLY
Qty: 30 TABLET | Refills: 0 | Status: SHIPPED | OUTPATIENT
Start: 2024-02-15 | End: 2024-04-18 | Stop reason: ALTCHOICE

## 2024-03-29 DIAGNOSIS — E88.810 METABOLIC SYNDROME: ICD-10-CM

## 2024-03-29 RX ORDER — METFORMIN HYDROCHLORIDE 500 MG/1
1000 TABLET, EXTENDED RELEASE ORAL 2 TIMES DAILY
Qty: 120 TABLET | Refills: 6 | Status: SHIPPED | OUTPATIENT
Start: 2024-03-29

## 2024-04-12 ENCOUNTER — TELEPHONE (OUTPATIENT)
Dept: PRIMARY CARE | Facility: CLINIC | Age: 59
End: 2024-04-12

## 2024-04-12 NOTE — TELEPHONE ENCOUNTER
Patient has an apt on 4/29/24 but is experiencing, night sweats, and blotchy swollen legs  She is asking to be seen sooner than the 29th.   Please advise

## 2024-04-18 ENCOUNTER — OFFICE VISIT (OUTPATIENT)
Dept: PRIMARY CARE | Facility: CLINIC | Age: 59
End: 2024-04-18
Payer: COMMERCIAL

## 2024-04-18 VITALS
HEART RATE: 98 BPM | BODY MASS INDEX: 30.67 KG/M2 | OXYGEN SATURATION: 98 % | TEMPERATURE: 97.7 F | SYSTOLIC BLOOD PRESSURE: 131 MMHG | WEIGHT: 190 LBS | DIASTOLIC BLOOD PRESSURE: 82 MMHG

## 2024-04-18 DIAGNOSIS — R60.9 PERIPHERAL EDEMA: Primary | ICD-10-CM

## 2024-04-18 PROBLEM — R94.31 PROLONGED QT INTERVAL: Status: RESOLVED | Noted: 2023-11-08 | Resolved: 2024-04-18

## 2024-04-18 PROBLEM — E16.1 HYPERINSULINISM: Status: RESOLVED | Noted: 2023-11-08 | Resolved: 2024-04-18

## 2024-04-18 PROBLEM — R07.9 ACUTE CHEST PAIN: Status: RESOLVED | Noted: 2023-11-08 | Resolved: 2024-04-18

## 2024-04-18 PROBLEM — E66.3 OVERWEIGHT: Status: RESOLVED | Noted: 2023-11-08 | Resolved: 2024-04-18

## 2024-04-18 PROBLEM — R60.0 PERIPHERAL EDEMA: Status: ACTIVE | Noted: 2024-04-18

## 2024-04-18 PROBLEM — E66.9 OBESITY: Status: RESOLVED | Noted: 2018-08-16 | Resolved: 2024-04-18

## 2024-04-18 PROCEDURE — 99213 OFFICE O/P EST LOW 20 MIN: CPT | Performed by: FAMILY MEDICINE

## 2024-04-18 PROCEDURE — 3075F SYST BP GE 130 - 139MM HG: CPT | Performed by: FAMILY MEDICINE

## 2024-04-18 PROCEDURE — 1036F TOBACCO NON-USER: CPT | Performed by: FAMILY MEDICINE

## 2024-04-18 PROCEDURE — 3079F DIAST BP 80-89 MM HG: CPT | Performed by: FAMILY MEDICINE

## 2024-04-18 NOTE — PROGRESS NOTES
Subjective   Patient ID: Alek Hirsch is a 59 y.o. female who presents for Joint Swelling (Both ankles swelling going on for a month ).    Assessment/Plan     Problem List Items Addressed This Visit       Peripheral edema - Primary     HPI    58-year-old female here complaining of  B/l le swelling more on rt > lft    B/l knee sx - and rt ankle sx   Probably with weight and venous insufficiency  Continue to monitor Ace wraps consider venous reflux study if symptoms are worsening    excessive sweating at night previous workup within normal limits was seen by endocrine  Was started on hydroxyzine and amitriptyline no response previously tried oxybutynin    nuclear stress test - WNL  Adipex was initiated by endocrine for weight loss as it may help with PCOS possible sweating    Advised to see dermatology if patient wants    Excessive sweating was seen by me endocrine GI had a significant lab work done no acute abnormality  Was seen by OB/GYN was started on veozah    History of right knee surgery  Mild carotid artery disease bilaterally  PCOS on metformin    No Known Allergies    Current Outpatient Medications   Medication Sig Dispense Refill    metFORMIN  mg 24 hr tablet TAKE 2 TABLETS BY MOUTH TWICE DAILY (Patient taking differently: Take 2 tablets (1,000 mg) by mouth once daily.) 120 tablet 6    multivit with min-folic acid (Women's Multivitamin Gummies) 200 mcg tablet,chewable Chew.      phentermine 37.5 mg capsule Take 1 capsule (37.5 mg) by mouth once daily in the morning. Take before meals. 30 capsule 2     No current facility-administered medications for this visit.       Objective   Visit Vitals  /82 (BP Location: Left arm, Patient Position: Sitting)   Pulse 98   Temp 36.5 °C (97.7 °F)   Wt 86.2 kg (190 lb)   SpO2 98%   BMI 30.67 kg/m²   Smoking Status Never   BSA 2 m²     Physical Exam  Constitutional:       General: He is not in acute distress.     Appearance: Normal appearance.   HENT:      Head:  Normocephalic and atraumatic.      Nose: Nose normal.   Eyes:      Extraocular Movements: Extraocular movements intact.      Conjunctiva/sclera: Conjunctivae normal.   Cardiovascular:      Rate and Rhythm: Normal rate and regular rhythm.   Pulmonary:      Effort: Pulmonary effort is normal.      Breath sounds: Normal breath sounds.   Skin:     General: Skin is warm.   Neurological:      Mental Status: He is alert and oriented to person, place, and time.   Psychiatric:         Mood and Affect: Mood normal.         Behavior: Behavior normal.   Immunization History   Administered Date(s) Administered    Flu vaccine (IIV4), preservative free *Check age/dose* 08/23/2020, 09/30/2021, 09/30/2022    Flu vaccine, quadrivalent, recombinant, preservative free, adult (FLUBLOK) 10/10/2023    Influenza, Unspecified 11/26/2011    Influenza, seasonal, injectable 11/07/2016, 10/04/2021    Influenza, seasonal, injectable, preservative free 10/04/2019    MMR vaccine, subcutaneous (MMR II) 05/24/2012    Moderna SARS-CoV-2 Vaccination 03/18/2021, 04/15/2021, 11/08/2021, 04/03/2022    Novel influenza-H1N1-09, preservative-free 12/30/2009    Td (adult), unspecified 05/24/2012    Typhoid, Parenteral 05/24/2012       Review of Systems    Orders Only on 12/19/2023   Component Date Value Ref Range Status    [facility] Rapid Influenza B Antig* 12/19/2023 Negative   Final    [facility] Rapid Influenza A Antig* 12/19/2023 Positive (A)   Final    [facility] RFAB INT QC 12/19/2023 Present   Final       Radiology: Reviewed imaging in powerchart.  No results found.    Family History   Problem Relation Name Age of Onset    Crohn's disease Mother      Heart attack Father       Social History     Socioeconomic History    Marital status:      Spouse name: None    Number of children: None    Years of education: None    Highest education level: None   Occupational History    None   Tobacco Use    Smoking status: Never    Smokeless tobacco: Never    Substance and Sexual Activity    Alcohol use: Not Currently    Drug use: Never    Sexual activity: None   Other Topics Concern    None   Social History Narrative    None     Social Determinants of Health     Financial Resource Strain: Not on file   Food Insecurity: Not on file   Transportation Needs: Not on file   Physical Activity: Not on file   Stress: Not on file   Social Connections: Not on file   Intimate Partner Violence: Not on file   Housing Stability: Not on file     Past Medical History:   Diagnosis Date    Overweight     Overweight    Personal history of other diseases of the female genital tract     History of PCOS     Past Surgical History:   Procedure Laterality Date    APPENDECTOMY  08/15/2018    Appendectomy    GALLBLADDER SURGERY  08/15/2018    Gallbladder Surgery    OTHER SURGICAL HISTORY  08/15/2018    Total Hysterectomy       Charting was completed using voice recognition technology and may include unintended errors.

## 2024-04-29 ENCOUNTER — APPOINTMENT (OUTPATIENT)
Dept: PRIMARY CARE | Facility: CLINIC | Age: 59
End: 2024-04-29
Payer: COMMERCIAL

## 2024-05-10 ENCOUNTER — OFFICE VISIT (OUTPATIENT)
Dept: ENDOCRINOLOGY | Facility: CLINIC | Age: 59
End: 2024-05-10
Payer: COMMERCIAL

## 2024-05-10 VITALS — WEIGHT: 186 LBS | SYSTOLIC BLOOD PRESSURE: 116 MMHG | DIASTOLIC BLOOD PRESSURE: 74 MMHG | BODY MASS INDEX: 30.02 KG/M2

## 2024-05-10 DIAGNOSIS — E88.819 INSULIN RESISTANCE: ICD-10-CM

## 2024-05-10 DIAGNOSIS — R73.03 PREDIABETES: Primary | ICD-10-CM

## 2024-05-10 DIAGNOSIS — I10 BENIGN ESSENTIAL HYPERTENSION: ICD-10-CM

## 2024-05-10 DIAGNOSIS — E28.2 POLYCYSTIC OVARY SYNDROME: ICD-10-CM

## 2024-05-10 PROCEDURE — 3074F SYST BP LT 130 MM HG: CPT | Performed by: INTERNAL MEDICINE

## 2024-05-10 PROCEDURE — 99214 OFFICE O/P EST MOD 30 MIN: CPT | Performed by: INTERNAL MEDICINE

## 2024-05-10 PROCEDURE — 3078F DIAST BP <80 MM HG: CPT | Performed by: INTERNAL MEDICINE

## 2024-05-10 NOTE — PROGRESS NOTES
Patient ID: Alek Hirsch is a 59 y.o. female who presents for Follow-up.  HPI  The patient comes in for follow up.    She has PCOS and insulin resistance diaphoresis hyperlipidemia hypertension.    She continues on metformin ER dropped down to 1000 mg which she has had no trouble tolerating and feels no different on the 1000 versus the 2000.    She had been on phentermine which she thinks may have helped a little bit.    She continues to struggle with diaphoresis has continued.    She was put on Veozah by her OB/GYN which has had no impact although she thinks she may not have given it enough of a try..    She has been on Elavil and hydroxyzine was in the no impact.    ROS  Comprehensive review of systems is negative.    Objective   Physical Exam  Visit Vitals  /74      Vitals:    05/10/24 1557   Weight: 84.4 kg (186 lb)      Body mass index is 30.02 kg/m².      Weight 186 down 6 pounds for total of 17 3.1% since starting phentermine    Eyes normal  ENT normal. No adenopathy  Thyroid palpable and normal. No nodules  Chest clear to auscultation  Heart sounds are normal  Abdomen nontender. Bowel sounds normal. No organomegaly  Feet are okay    Current Outpatient Medications   Medication Sig Dispense Refill    metFORMIN  mg 24 hr tablet TAKE 2 TABLETS BY MOUTH TWICE DAILY (Patient taking differently: Take 2 tablets (1,000 mg) by mouth once daily.) 120 tablet 6    multivit with min-folic acid (Women's Multivitamin Gummies) 200 mcg tablet,chewable Chew.       No current facility-administered medications for this visit.       Assessment/Plan     1.  Insulin resistance  2.  Hypertension  3.  Hyperlipidemia  4.  PCOS  5.  Diaphoresis    We discussed that she needed to achieve 5% weight loss with the phentermine to continue with that.    She will discontinue.    She will work on diet and exercise.    She will consider following up with her OB/GYN for another trial of Veozah.    She will follow-up with me in 6  months sooner as needed.

## 2024-09-24 ENCOUNTER — TELEPHONE (OUTPATIENT)
Dept: PRIMARY CARE | Facility: CLINIC | Age: 59
End: 2024-09-24
Payer: COMMERCIAL

## 2024-09-24 NOTE — TELEPHONE ENCOUNTER
States she has an appointment 11/12, says last visit she failed to meet the weight loss requirement. Wants to know if there is a weight requirement for her to start the medication in November, please advise.

## 2024-11-12 ENCOUNTER — APPOINTMENT (OUTPATIENT)
Dept: ENDOCRINOLOGY | Facility: CLINIC | Age: 59
End: 2024-11-12
Payer: COMMERCIAL

## 2024-11-12 ENCOUNTER — TELEPHONE (OUTPATIENT)
Facility: CLINIC | Age: 59
End: 2024-11-12

## 2024-11-12 VITALS
BODY MASS INDEX: 33.25 KG/M2 | WEIGHT: 206 LBS | HEART RATE: 74 BPM | DIASTOLIC BLOOD PRESSURE: 74 MMHG | SYSTOLIC BLOOD PRESSURE: 128 MMHG

## 2024-11-12 DIAGNOSIS — E28.2 POLYCYSTIC OVARY SYNDROME: ICD-10-CM

## 2024-11-12 DIAGNOSIS — E88.819 INSULIN RESISTANCE: ICD-10-CM

## 2024-11-12 DIAGNOSIS — I10 BENIGN ESSENTIAL HYPERTENSION: ICD-10-CM

## 2024-11-12 DIAGNOSIS — R73.03 PREDIABETES: Primary | ICD-10-CM

## 2024-11-12 PROCEDURE — 3078F DIAST BP <80 MM HG: CPT | Performed by: INTERNAL MEDICINE

## 2024-11-12 PROCEDURE — 3074F SYST BP LT 130 MM HG: CPT | Performed by: INTERNAL MEDICINE

## 2024-11-12 PROCEDURE — 99214 OFFICE O/P EST MOD 30 MIN: CPT | Performed by: INTERNAL MEDICINE

## 2024-11-12 RX ORDER — PHENTERMINE HYDROCHLORIDE 37.5 MG/1
37.5 CAPSULE ORAL
Qty: 30 CAPSULE | Refills: 2 | Status: SHIPPED | OUTPATIENT
Start: 2024-11-12 | End: 2025-02-10

## 2024-11-12 NOTE — PROGRESS NOTES
Patient ID: Alek Hirsch is a 59 y.o. female who presents for Follow-up.  HPI  The patient comes in for follow up.    She has PCOS and insulin resistance diaphoresis hyperlipidemia hypertension.    She continues on metformin ER dropped down to 1000 mg which she has had no trouble tolerating and feels no different on the 1000 versus the 2000.    She had been on phentermine which she thinks may have helped a little bit.    She has been off of it for 6 months.    She is just getting over an upper respiratory infection.    She continues to struggle with diaphoresis has continued.    She was put on Veozah by her OB/GYN which has had no impact although she thinks she may not have given it enough of a try.    She has been on Elavil and hydroxyzine was in the no impact.    ROS  Comprehensive review of systems is negative.    Objective   Physical Exam  Visit Vitals  /74   Pulse 74      Vitals:    11/12/24 0822   Weight: 93.4 kg (206 lb)      Body mass index is 33.25 kg/m².      Weight 206 up 20 pounds    Eyes normal  ENT normal. No adenopathy  Thyroid palpable and normal. No nodules  Chest clear to auscultation  Heart sounds are normal  Abdomen nontender. Bowel sounds normal. No organomegaly  Feet are okay    Current Outpatient Medications   Medication Sig Dispense Refill    metFORMIN  mg 24 hr tablet TAKE 2 TABLETS BY MOUTH TWICE DAILY (Patient taking differently: Take 2 tablets (1,000 mg) by mouth once daily.) 120 tablet 6    multivit with min-folic acid (Women's Multivitamin Gummies) 200 mcg tablet,chewable Chew.      phentermine 37.5 mg capsule Take 1 capsule (37.5 mg) by mouth once daily in the morning. Take before meals. 30 capsule 2     No current facility-administered medications for this visit.       Assessment/Plan     1.  Insulin resistance  2.  Prediabetes  3.  Hypertension  4.  Hyperlipidemia  5.  PCOS    We discussed her options.    We will add back in phentermine.    We discussed risk benefits and  alternatives.    She is going to work on diet and exercise.    We again discussed a goal of 5% at 3 months.    Will check hemoglobin A1c TSH and BMP today.    She will follow-up with me in 3 months sooner as needed.

## 2024-11-23 LAB
NON-UH HIE BUN/CREAT RATIO: 20
NON-UH HIE BUN: 16 MG/DL (ref 9–23)
NON-UH HIE CALCIUM: 10.7 MG/DL (ref 8.7–10.4)
NON-UH HIE CALCULATED OSMOLALITY: 278 MOSM/KG (ref 275–295)
NON-UH HIE CHLORIDE: 106 MMOL/L (ref 98–107)
NON-UH HIE CO2, VENOUS: 26 MMOL/L (ref 20–31)
NON-UH HIE CREATININE: 0.8 MG/DL (ref 0.5–0.8)
NON-UH HIE GFR AA: >60
NON-UH HIE GLOMERULAR FILTRATION RATE: >60 ML/MIN/1.73M?
NON-UH HIE GLUCOSE: 89 MG/DL (ref 74–106)
NON-UH HIE HGB A1C: 5.5 %
NON-UH HIE K: 5 MMOL/L (ref 3.5–5.1)
NON-UH HIE NA: 139 MMOL/L (ref 135–145)
NON-UH HIE TSH: 0.75 UIU/ML (ref 0.55–4.78)

## 2024-12-06 ENCOUNTER — TELEPHONE (OUTPATIENT)
Dept: PRIMARY CARE | Facility: CLINIC | Age: 59
End: 2024-12-06
Payer: COMMERCIAL

## 2024-12-06 NOTE — TELEPHONE ENCOUNTER
Wants results, states her Calcium is high and thyroid is low, states she has symptoms, rapid heart beat , sensitive to heat, please advise.

## 2024-12-06 NOTE — TELEPHONE ENCOUNTER
Her thyroid level is normal.  I cannot blame her symptoms on it and she should probably follow-up with her primary care doctor.  Her calcium level is slightly elevated but it is not a sensitive test.  We can check ionized calcium CMP PTH and repeat TSH if she would like.

## 2024-12-23 ENCOUNTER — APPOINTMENT (OUTPATIENT)
Dept: PRIMARY CARE | Facility: CLINIC | Age: 59
End: 2024-12-23
Payer: COMMERCIAL

## 2024-12-23 VITALS
DIASTOLIC BLOOD PRESSURE: 90 MMHG | HEART RATE: 97 BPM | SYSTOLIC BLOOD PRESSURE: 140 MMHG | HEIGHT: 66 IN | OXYGEN SATURATION: 97 % | WEIGHT: 194 LBS | BODY MASS INDEX: 31.18 KG/M2 | TEMPERATURE: 97.7 F

## 2024-12-23 DIAGNOSIS — Z00.00 VISIT FOR PREVENTIVE HEALTH EXAMINATION: Primary | ICD-10-CM

## 2024-12-23 DIAGNOSIS — K29.50 OTHER CHRONIC GASTRITIS WITHOUT HEMORRHAGE: ICD-10-CM

## 2024-12-23 DIAGNOSIS — K22.70 BARRETT'S ESOPHAGUS WITHOUT DYSPLASIA: ICD-10-CM

## 2024-12-23 DIAGNOSIS — I83.92 SPIDER VEIN OF LEFT LOWER EXTREMITY: ICD-10-CM

## 2024-12-23 PROCEDURE — 99213 OFFICE O/P EST LOW 20 MIN: CPT | Performed by: FAMILY MEDICINE

## 2024-12-23 PROCEDURE — 1036F TOBACCO NON-USER: CPT | Performed by: FAMILY MEDICINE

## 2024-12-23 PROCEDURE — 3008F BODY MASS INDEX DOCD: CPT | Performed by: FAMILY MEDICINE

## 2024-12-23 PROCEDURE — 3080F DIAST BP >= 90 MM HG: CPT | Performed by: FAMILY MEDICINE

## 2024-12-23 PROCEDURE — 3077F SYST BP >= 140 MM HG: CPT | Performed by: FAMILY MEDICINE

## 2024-12-23 PROCEDURE — 99396 PREV VISIT EST AGE 40-64: CPT | Performed by: FAMILY MEDICINE

## 2024-12-23 RX ORDER — FAMOTIDINE 20 MG/1
TABLET, FILM COATED ORAL
COMMUNITY

## 2024-12-23 RX ORDER — BACLOFEN 20 MG
TABLET ORAL
COMMUNITY

## 2024-12-23 RX ORDER — PANTOPRAZOLE SODIUM 40 MG/1
40 TABLET, DELAYED RELEASE ORAL DAILY
Qty: 90 TABLET | Refills: 3 | Status: SHIPPED | OUTPATIENT
Start: 2024-12-23 | End: 2025-12-23

## 2024-12-23 NOTE — PROGRESS NOTES
Subjective   Patient ID: Alek Hirsch is a 59 y.o. female who presents for Annual Exam (Pt is fasting).    Assessment/Plan     Problem List Items Addressed This Visit       Visit for preventive health examination - Primary    Relevant Orders    TSH with reflex to Free T4 if abnormal    Lipid Panel    Comprehensive Metabolic Panel    CBC    Urinalysis with Reflex Microscopic    Spider vein of left lower extremity    Garcia's esophagus without dysplasia    Relevant Medications    pantoprazole (ProtoNix) 40 mg EC tablet    Chronic gastritis without bleeding    Relevant Medications    pantoprazole (ProtoNix) 40 mg EC tablet   Continue follow-up with endocrine with Adipex  Discussed about diet exercise  Discussed about age-related immunization  Discussed about fasting lab work  Follow-up every year for physical  Mammogram up to date  Pap smear -hysterectomy 2014  Colon cancer screening up-to-date in July 2022 - repeat in 10 yrs  Dysphagia - stll c/p - add PPI    Come back early with any new signs and symptoms  Patient understood and agreed with plan.  HPI    59-year-old female here for physical      Chronic night sweats  Hyperlipidemia  Insulin resistance  Sweating at night previous workup within normal limits was seen by endocrine  Was started on hydroxyzine and amitriptyline no response previously tried oxybutynin    Spider veins - consult vascular  Still complain on and off dysphagia with Garcia's esophagus continue famotidine or we can add PPI patient agreed to add PPI  Advised to see GI  nuclear stress test - WNL  Adipex was initiated by endocrine for weight loss as it may help with PCOS possible sweating    Advised to see dermatology if patient wants    Excessive sweating was seen by me endocrine GI had a significant lab work done no acute abnormality  Was seen by OB/GYN was started on veozah stopped - not helpful    History of right knee surgery  Mild carotid artery disease bilaterally  PCOS on metformin    No  "Known Allergies    Current Outpatient Medications   Medication Sig Dispense Refill   • famotidine (Pepcid) 20 mg tablet Take by mouth.     • magnesium oxide 500 mg magnesium tablet Take by mouth.     • metFORMIN  mg 24 hr tablet TAKE 2 TABLETS BY MOUTH TWICE DAILY (Patient taking differently: Take 1 tablet (500 mg) by mouth 2 times daily (morning and late afternoon).) 120 tablet 6   • multivit with min-folic acid (Women's Multivitamin Gummies) 200 mcg tablet,chewable Chew.     • phentermine 37.5 mg capsule Take 1 capsule (37.5 mg) by mouth once daily in the morning. Take before meals. 30 capsule 2   • pantoprazole (ProtoNix) 40 mg EC tablet Take 1 tablet (40 mg) by mouth once daily. Do not crush, chew, or split. 90 tablet 3     No current facility-administered medications for this visit.       Objective   Visit Vitals  /90 (BP Location: Left arm, Patient Position: Sitting)   Pulse 97   Temp 36.5 °C (97.7 °F)   Ht 1.676 m (5' 6\")   Wt 88 kg (194 lb)   SpO2 97%   BMI 31.31 kg/m²   Smoking Status Never   BSA 2.02 m²     Physical Exam  Constitutional   General appearance: Alert and in no acute distress.   Head and Face   Head and face: Normal.     Palpation of the face and sinuses: Normal.    Eyes   Inspection of eyes: Sclera and conjunctiva were normal.    Pupil exam: Pupils were equal in size. Extraocular movements were intact.   Ears, Nose, Mouth, and Throat   Ears: Auricles: Normal.    Otoscopic examination: Tympanic membranes: Normal with no congestion and no discharge. Otic Canals: Normal without tenderness, congestion or discharge.    Hearing: Normal.     Nasal mucosa, septum, and turbinates: Normal without edema or erythema.    Lips, teeth, and gums: Normal.    Oropharynx: Normal with moist mucus membranes, no congestion. Tonsils: Normal no follicles.   Neck   Neck Exam: Appearance of the neck was normal. No neck masses observed.    Thyroid exam: Not enlarged and no palpable thyroid nodules. "   Pulmonary   Respiratory assessment: No respiratory distress, normal respiratory rhythm and effort.    Auscultation of Lungs: Clear bilateral breath sounds.   Cardiovascular   Auscultation of heart: Apical pulse normal, heart rate and rhythm normal, normal S1 and S2, no murmurs and no pericardial rub.    Carotid arteries: Pulses normal with no bruits.    Exam for edema: No peripheral edema.   Chest   Chest: Normal A_P diameter, no pulsation, no intercostal withdrawing. Trachea central.   Abdomen   Abdominal Exam: No bruits, normal bowel sounds, soft, non-tender, no abdominal mass palpated.    Liver and Spleen exam: No hepato-splenomegaly.    Examination for hernias: Normal.      Lymphatic   Palpation of lymph nodes in neck: No cervical lymphadenopathy.   Musculoskeletal   Examination of gait: Normal.    Inspection of digits and nails: No clubbing or cyanosis of the fingernails.    Inspection/palpation of joints, bones and muscles: No joint swelling. Normal movement of all extremities.    Range of Motion: Normal movement of all extremities.   Skin   Skin inspection: Normal skin color and pigmentation, normal skin turgor and no visible rash.   Neurologic   Cranial nerves: Nerves 2-12 were intact, no focal neuro defects.    Reflexes: Normal.     Sensation: Normal.     Coordination: Normal.    Psychiatric   Judgment and insight: Intact.    Orientation: Oriented to person, place, and time.    Recent and remote memory: Normal.     Mood and affect: Normal.     Genitourinary -follow-up with OB/GYN   Immunization History   Administered Date(s) Administered   • Flu vaccine (IIV4), preservative free *Check age/dose* 10/04/2019, 08/23/2020, 09/30/2021, 09/30/2022   • Flu vaccine, quadrivalent, recombinant, preservative free, adult (FLUBLOK) 10/10/2023   • Flu vaccine, trivalent, preservative free, age 6 months and greater (Fluarix/Fluzone/Flulaval) 10/04/2019, 10/01/2024   • Influenza, Unspecified 11/26/2011   • Influenza,  seasonal, injectable 11/07/2016, 10/04/2021   • MMR vaccine, subcutaneous (MMR II) 05/24/2012   • Moderna COVID-19 vaccine, 12 years and older (50mcg/0.5mL)(Spikevax) 10/01/2024   • Moderna SARS-CoV-2 Vaccination 03/18/2021, 04/15/2021, 11/08/2021, 04/03/2022   • Novel influenza-H1N1-09, preservative-free 12/30/2009   • Td (adult), unspecified 05/24/2012   • Typhoid, Parenteral 05/24/2012       Review of Systems    Orders Only on 11/23/2024   Component Date Value Ref Range Status   • [facility] TSH 11/23/2024 0.75  0.55 - 4.78 uIU/ml Final   • [facility] K 11/23/2024 5.0  3.5 - 5.1 mmol/L Final   • [facility] BUN 11/23/2024 16  9 - 23 mg/dL Final   • [facility] Calcium 11/23/2024 10.7 (H)  8.7 - 10.4 mg/dL Final   • [facility] Glucose 11/23/2024 89  74 - 106 mg/dL Final   • [facility] Chloride 11/23/2024 106  98 - 107 mmol/L Final   • [facility] Glomerular Filtration R* 11/23/2024 >60  mL/min/1.73m? Final   • [facility] BUN/Creat Ratio 11/23/2024 20.0   Final   • [facility] Na 11/23/2024 139  135 - 145 mmol/L Final   • [facility] Creatinine 11/23/2024 0.8  0.5 - 0.8 mg/dL Final   • [facility] GFR AA 11/23/2024 >60   Final   • [facility] CO2, venous 11/23/2024 26.0  20.0 - 31.0 mmol/L Final   • [facility] Calculated Osmolality 11/23/2024 278  275 - 295 mOsm/kg Final   • [facility] HGB A1C 11/23/2024 5.5  % Final       Radiology: Reviewed imaging in powerchart.  No results found.    Family History   Problem Relation Name Age of Onset   • Crohn's disease Mother     • Heart attack Father       Social History     Socioeconomic History   • Marital status:    Tobacco Use   • Smoking status: Never   • Smokeless tobacco: Never   Substance and Sexual Activity   • Alcohol use: Not Currently     Comment: occasional   • Drug use: Never     Past Medical History:   Diagnosis Date   • Overweight     Overweight   • Personal history of other diseases of the female genital tract     History of PCOS     Past Surgical History:    Procedure Laterality Date   • APPENDECTOMY  08/15/2018    Appendectomy   • GALLBLADDER SURGERY  08/15/2018    Gallbladder Surgery   • OTHER SURGICAL HISTORY  08/15/2018    Total Hysterectomy       Charting was completed using voice recognition technology and may include unintended errors.

## 2024-12-24 ENCOUNTER — APPOINTMENT (OUTPATIENT)
Dept: PRIMARY CARE | Facility: CLINIC | Age: 59
End: 2024-12-24
Payer: COMMERCIAL

## 2025-02-08 DIAGNOSIS — R73.03 PREDIABETES: ICD-10-CM

## 2025-02-09 RX ORDER — PHENTERMINE HYDROCHLORIDE 37.5 MG/1
37.5 CAPSULE ORAL
Qty: 30 CAPSULE | Refills: 2 | Status: SHIPPED | OUTPATIENT
Start: 2025-02-09 | End: 2025-05-10

## 2025-03-14 ENCOUNTER — APPOINTMENT (OUTPATIENT)
Dept: ENDOCRINOLOGY | Facility: CLINIC | Age: 60
End: 2025-03-14
Payer: COMMERCIAL

## 2025-03-14 VITALS
BODY MASS INDEX: 29.54 KG/M2 | DIASTOLIC BLOOD PRESSURE: 72 MMHG | WEIGHT: 183 LBS | SYSTOLIC BLOOD PRESSURE: 122 MMHG | HEART RATE: 74 BPM

## 2025-03-14 DIAGNOSIS — E28.2 POLYCYSTIC OVARY SYNDROME: ICD-10-CM

## 2025-03-14 DIAGNOSIS — E88.819 INSULIN RESISTANCE: ICD-10-CM

## 2025-03-14 DIAGNOSIS — R73.03 PREDIABETES: Primary | ICD-10-CM

## 2025-03-14 DIAGNOSIS — I10 BENIGN ESSENTIAL HYPERTENSION: ICD-10-CM

## 2025-03-14 PROCEDURE — 99214 OFFICE O/P EST MOD 30 MIN: CPT | Performed by: INTERNAL MEDICINE

## 2025-03-14 PROCEDURE — 3078F DIAST BP <80 MM HG: CPT | Performed by: INTERNAL MEDICINE

## 2025-03-14 PROCEDURE — 3074F SYST BP LT 130 MM HG: CPT | Performed by: INTERNAL MEDICINE

## 2025-03-14 RX ORDER — PHENTERMINE HYDROCHLORIDE 37.5 MG/1
37.5 CAPSULE ORAL
Qty: 30 CAPSULE | Refills: 2 | Status: SHIPPED | OUTPATIENT
Start: 2025-03-14 | End: 2025-06-12

## 2025-03-14 NOTE — PROGRESS NOTES
Patient ID: Alek Hirsch is a 60 y.o. female who presents for Follow-up.  HPI  The patient comes in for follow up.    She has PCOS and insulin resistance diaphoresis hyperlipidemia hypertension.    She continues on metformin ER dropped down to 1000 mg which she has had no trouble tolerating and feels no different on the 1000 versus the 2000.    She resumed phentermine 37.5 mg at the last appointment.    She has had no difficulty tolerating it.    She continues to struggle with diaphoresis has continued.    She was put on Veozah by her OB/GYN which has had no impact although she thinks she may not have given it enough of a try.    She has been on Elavil and hydroxyzine was in the no impact.    ROS  Comprehensive review of systems is negative.    Objective   Physical Exam  Visit Vitals  /72   Pulse 74      Vitals:    03/14/25 0832   Weight: 83 kg (183 lb)      Body mass index is 29.54 kg/m².      Weight 183 down 23 pounds for a total of 11%.    Eyes normal  ENT normal. No adenopathy  Thyroid palpable and normal. No nodules  Chest clear to auscultation  Heart sounds are normal  Abdomen nontender. Bowel sounds normal. No organomegaly  Feet are okay    Current Outpatient Medications   Medication Sig Dispense Refill    metFORMIN  mg 24 hr tablet TAKE 2 TABLETS BY MOUTH TWICE DAILY (Patient taking differently: Take 1 tablet (500 mg) by mouth 2 times daily (morning and late afternoon).) 120 tablet 6    multivit with min-folic acid (Women's Multivitamin Gummies) 200 mcg tablet,chewable Chew.      pantoprazole (ProtoNix) 40 mg EC tablet Take 1 tablet (40 mg) by mouth once daily. Do not crush, chew, or split. 90 tablet 3    phentermine 37.5 mg capsule Take 1 capsule (37.5 mg) by mouth once daily in the morning. Take before meals. 30 capsule 2     No current facility-administered medications for this visit.       Assessment/Plan     1.  Insulin resistance  2.  Prediabetes  3.  Hypertension  4.   Hyperlipidemia    She will continue her current regimen.    She will continue working on diet and exercise.    She will continue on the phentermine.    We will hold on blood work today.    She will follow-up with me in 3 months sooner as needed.

## 2025-04-05 DIAGNOSIS — E88.810 METABOLIC SYNDROME: ICD-10-CM

## 2025-04-06 RX ORDER — METFORMIN HYDROCHLORIDE 500 MG/1
1000 TABLET, EXTENDED RELEASE ORAL 2 TIMES DAILY
Qty: 120 TABLET | Refills: 6 | Status: SHIPPED | OUTPATIENT
Start: 2025-04-06

## 2025-08-01 ENCOUNTER — APPOINTMENT (OUTPATIENT)
Dept: ENDOCRINOLOGY | Facility: CLINIC | Age: 60
End: 2025-08-01
Payer: COMMERCIAL

## 2025-08-01 VITALS
SYSTOLIC BLOOD PRESSURE: 122 MMHG | HEART RATE: 76 BPM | DIASTOLIC BLOOD PRESSURE: 70 MMHG | BODY MASS INDEX: 28.89 KG/M2 | WEIGHT: 179 LBS

## 2025-08-01 DIAGNOSIS — R73.03 PREDIABETES: ICD-10-CM

## 2025-08-01 PROCEDURE — 3078F DIAST BP <80 MM HG: CPT | Performed by: INTERNAL MEDICINE

## 2025-08-01 PROCEDURE — 3074F SYST BP LT 130 MM HG: CPT | Performed by: INTERNAL MEDICINE

## 2025-08-01 PROCEDURE — 99214 OFFICE O/P EST MOD 30 MIN: CPT | Performed by: INTERNAL MEDICINE

## 2025-08-01 RX ORDER — PHENTERMINE HYDROCHLORIDE 37.5 MG/1
37.5 CAPSULE ORAL
Qty: 30 CAPSULE | Refills: 2 | Status: SHIPPED | OUTPATIENT
Start: 2025-08-01 | End: 2025-10-30

## 2025-08-01 NOTE — PROGRESS NOTES
Patient ID: Alek Hirsch is a 60 y.o. female who presents for Follow-up (Prediabetes follow up).  HPI  The patient comes in for follow up.    She has PCOS and insulin resistance diaphoresis hyperlipidemia hypertension.    She continues on metformin ER dropped down to 1000 mg which she has had no trouble tolerating and feels no different on the 1000 versus the 2000.    She continues on phentermine 37.5 mg.    She has had no difficulty tolerating it.    She continues to struggle with diaphoresis has continued.    She was put on Veozah by her OB/GYN which has had no impact although she thinks she may not have given it enough of a try.    She has been on Elavil and hydroxyzine was in the no impact.    ROS  Comprehensive review of systems is negative.    Objective   Physical Exam  There were no vitals taken for this visit.   Vitals:    08/01/25 1638   Weight: 81.2 kg (179 lb)      Body mass index is 28.89 kg/m².      Weight 179 down 4 pounds for a total of 27    Eyes normal  ENT normal. No adenopathy  Thyroid palpable and normal. No nodules  Chest clear to auscultation  Heart sounds are normal  Abdomen nontender. Bowel sounds normal. No organomegaly  Feet are okay    Current Medications[1]    Assessment/Plan     1.  PCOS  2.  Hyperlipidemia  3.  Hypertension    She will continue work on diet and exercise.    We will hold on blood work today.    She will continue the phentermine for another 3 months.    I have personally reviewed the [facility] report for this patient. This report is scanned into the electronic medical record. I consider the risks of abuse, dependence, addiction and diversion. I believe that is clinically appropriate for this patient to be prescribed this medication.    She will follow-up with me in 3 months sooner as needed.                   [1]   Current Outpatient Medications   Medication Sig Dispense Refill    metFORMIN  mg 24 hr tablet TAKE 2 TABLETS BY MOUTH TWICE DAILY 120 tablet 6    multivit  with min-folic acid (Women's Multivitamin Gummies) 200 mcg tablet,chewable Chew.      pantoprazole (ProtoNix) 40 mg EC tablet Take 1 tablet (40 mg) by mouth once daily. Do not crush, chew, or split. 90 tablet 3    phentermine 37.5 mg capsule Take 1 capsule (37.5 mg) by mouth once daily in the morning. Take before meals. 30 capsule 2     No current facility-administered medications for this visit.

## 2026-01-05 ENCOUNTER — APPOINTMENT (OUTPATIENT)
Dept: ENDOCRINOLOGY | Facility: CLINIC | Age: 61
End: 2026-01-05
Payer: COMMERCIAL